# Patient Record
Sex: MALE | Race: WHITE | NOT HISPANIC OR LATINO | Employment: OTHER | URBAN - METROPOLITAN AREA
[De-identification: names, ages, dates, MRNs, and addresses within clinical notes are randomized per-mention and may not be internally consistent; named-entity substitution may affect disease eponyms.]

---

## 2017-06-04 ENCOUNTER — OFFICE VISIT (OUTPATIENT)
Dept: URGENT CARE | Facility: RETAIL CLINIC | Age: 67
End: 2017-06-04
Payer: COMMERCIAL

## 2017-06-04 VITALS
OXYGEN SATURATION: 100 % | TEMPERATURE: 97 F | HEART RATE: 70 BPM | DIASTOLIC BLOOD PRESSURE: 84 MMHG | SYSTOLIC BLOOD PRESSURE: 140 MMHG

## 2017-06-04 DIAGNOSIS — H65.01 RIGHT ACUTE SEROUS OTITIS MEDIA, RECURRENCE NOT SPECIFIED: Primary | ICD-10-CM

## 2017-06-04 DIAGNOSIS — Z96.22 STATUS POST MYRINGOTOMY WITH INSERTION OF TUBE: ICD-10-CM

## 2017-06-04 PROCEDURE — 99213 OFFICE O/P EST LOW 20 MIN: CPT | Performed by: PHYSICIAN ASSISTANT

## 2017-06-04 RX ORDER — AMOXICILLIN 875 MG
875 TABLET ORAL 2 TIMES DAILY
Qty: 20 TABLET | Refills: 0 | Status: SHIPPED | OUTPATIENT
Start: 2017-06-04

## 2017-06-04 NOTE — PROGRESS NOTES
See CC    SUBJECTIVE:   Pt. presenting to [facility] -  with a chief complaint of earache - possible infection vs cerumen (?). He thinks he has a hold in rt TM and knows he has PE tube left ear - there has been some yellowish fluid left ear. More ear pressure and ache on right.  No URI symptoms  [facility]  Onset of symptoms gradual 1-2 weeks, will see ENT but [facility].  Course of illness is same.    Severity mild  Current and Associated symptoms: ear discomfort and drainage on right  Treatment measures tried include None tried.  Predisposing factors include see above.  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     C UNLISTED LAPAROSCOPY PROC,INTESTINE  2000    Right hemicolectomy and chemotherapy for colon cancer     COLONOSCOPY  10/02/07     HC COLONOSCOPY W BX FORCEP/CAUTERY REMOVAL TELLY/POLYP/LESION  08/11/2004    [facility] [place]     HC CREATE EARDRUM OPENING,GEN ANESTH      P.E. Tubes     HC MYRINGOPLASTY  3/24/2004    Left exploratory tympanoplasty. Middle ear reconstruction with PORP.     SURGICAL HISTORY OF -   09/27/2006    Evaluate table saw injury, left hand,  ultimate amputation completion  & closure left thumb.       Patient Active Problem List   Diagnosis     Pain in  joint, pelvic region and thigh     Temporomandibular joint disorder     DEEP VENOUS PHLEBITIS-ARM     History of malignant neoplasm of large intestine     Chronic suppurative otitis media     Other diseases of nasal cavity and sinuses     Long term current use of anticoagulant therapy     Other specified disorder of skin     Other congenital anomaly of lung     Benign neoplasm of major salivary gland     Carpal tunnel syndrome     Problem with sexual function     Depressive disorder, not elsewhere classified     Tobacco abuse     AVM (arteriovenous malformation) brain     Current Outpatient Prescriptions   Medication     triamterene-hydrochlorothiazide (MAXZIDE-25) 37.5-25 MG per tablet     BUPROPION HCL PO     IBUPROFEN PO     DEXAMETHASONE PO     TOPIRAMATE PO     nystatin-triamcinolone (MYCOLOG) ointment     gabapentin (NEURONTIN) 300 MG tablet     esomeprazole (NEXIUM) 40 MG capsule     sildenafil (VIAGRA) 100 MG tablet     loratadine (CLARITIN) 10 MG tablet     tamsulosin (FLOMAX) 0.4 MG 24 hr capsule     cyclobenzaprine (FLEXERIL) 10 MG tablet     rOPINIRole (REQUIP) 1 MG tablet     rOPINIRole (REQUIP) 2 MG tablet     mometasone (NASONEX) 50 MCG/ACT nasal spray     latanoprost (XALATAN) 0.005 % ophthalmic solution     CELEBREX 200 MG OR CAPS     VICODIN 5-500 MG OR TABS     LEXAPRO 10 MG OR TABS     WELLBUTRIN  MG OR TBCR     No current facility-administered medications for this visit.        OBJECTIVE:  /84 (BP Location: Right arm, Patient Position: Chair, Cuff Size: Adult Regular)  Pulse 70  Temp 97  F (36.1  C) (Oral)  SpO2 100%    GENERAL APPEARANCE: cooperative, alert and no distress. Walks with cane  Appears well hydrated.  EYES: conjunctiva clear  HENT: Rt -wearing hearing aid -removes it for exam  Rt ear canal  Some yellowish cerumen and TM some erythema upper rim - no perf seen but I can see only 2/3 of TM  Lt ear canal with watery yellowish-green discharge and TM normal with PE tube  noted  Nose no congestion. no discharge  Mouth without ulcers or lesions. no erythema. no exudate.   NECK: supple, few small shoddy NT ant nodes. No  posterior nodes.  RESP: lungs clear to auscultation - no rales, rhonchi or wheezes. Breathing easily.      ASSESSMENT:     Right acute serous otitis media, recurrence not specified  Status post myringotomy with insertion of tube        PLAN:  Symptomatic measures   Prescriptions as below. Discussed indications, dosing, side affects and adverse reactions of medications with  Patient -Amox  Keep ears dry  FOLLOW UP with ENT as planned - check with [facility] ([facility]) or ENT earlier if not improving.  Eat yogurt daily or take a probiotic supplement when on antibiotics.  > rest.  > fluids.  Contagiousness and hygiene discussed.  Fever and pain  control measures discussed.  If unable to swallow or any breathing difficulty to go to ED     Pty is comfortable with this plan.  Electronically signed,  KIERA Worley

## 2017-06-04 NOTE — PATIENT INSTRUCTIONS
Please FOLLOW UP at primary care clinic if not improving, new symptoms, worse or this does not resolve.  FOLLOW UP w ENT as planned

## 2017-06-04 NOTE — MR AVS SNAPSHOT
"              After Visit Summary   2017    Anurag Mejia    MRN: [id number]           Patient Information     Date Of Birth          1950        Visit Information        Provider Department      2017 3:10 PM Lori Tanner PA-C [facility]        Today's Diagnoses     Right acute serous otitis media, recurrence not specified    -  1      Care Instructions      Please FOLLOW UP at primary care clinic if not improving, new symptoms, worse or this does not resolve.  FOLLOW UP w ENT as planned          Follow-ups after your visit        Who to contact     You can reach your care team any time of the day by calling [phone].  Notification of test results:  If you have an abnormal lab result, we will notify you by phone as soon as possible.         Additional Information About Your Visit        MyChart Information     [employer] lets you send messages to your doctor, view your test results, renew your prescriptions, schedule appointments and more. To sign up, go to [email or web address] . Click on \"Log in\" on the left side of the screen, which will take you to the Welcome page. Then click on \"Sign up Now\" on the right side of the page.     You will be asked to enter the access code listed below, as well as some personal information. Please follow the directions to create your username and password.     Your access code is: [email or web address]  Expires: 2017  3:22 PM     Your access code will  in 90 days. If you need help or a new code, please call your [facility] clinic or [phone].        Care EveryWhere ID     This is your Care EveryWhere ID. This could be used by other organizations to access your [facility] medical records  [id number]        Your Vitals Were     Pulse Temperature Pulse Oximetry             70 97  F (36.1  C) (Oral) 100%          Blood Pressure from Last 3 Encounters:   17 140/84   04/14/15 143/89   14 127/76    Weight from Last 3 Encounters: "   10/23/13 260 lb (117.9 kg)   08/09/07 240 lb (108.9 kg)   12/17/04 218 lb 4 oz (99 kg)              Today, you had the following     No orders found for display         Today's Medication Changes          These changes are accurate as of: 6/4/17  3:23 PM.  If you have any questions, ask your nurse or doctor.               Start taking these medicines.        Dose/Directions    amoxicillin 875 MG tablet   Commonly known as:  AMOXIL   Used for:  Right acute serous otitis media, recurrence not specified   Started by:  Lori Tanner PA-C        Dose:  875 mg   Take 1 tablet (875 mg) by mouth 2 times daily   Quantity:  20 tablet   Refills:  0            Where to get your medicines      These medications were sent to [address]  [address] [facility] [address]     Phone:  [phone]     amoxicillin 875 MG tablet                Primary Care Provider Office Phone # Fax #    Chris Olsen [phone]       [address] [address]        Thank you!     Thank you for choosing [facility]  for your care. Our goal is always to provide you with excellent care. Hearing back from our patients is one way we can continue to improve our services. Please take a few minutes to complete the written survey that you may receive in the mail after your visit with us. Thank you!             Your Updated Medication List - Protect others around you: Learn how to safely use, store and throw away your medicines at www.disposemymeds.org.          This list is accurate as of: 6/4/17  3:23 PM.  Always use your most recent med list.                   Brand Name Dispense Instructions for use    amoxicillin 875 MG tablet    AMOXIL    20 tablet    Take 1 tablet (875 mg) by mouth 2 times daily       celeBREX 200 MG capsule   Generic drug:  celecoxib     30    1 Capsule daily       DEXAMETHASONE PO      Take 4 mg by mouth        FLEXERIL 10 MG tablet   Generic drug:  cyclobenzaprine      Take 10 mg by mouth daily       gabapentin 300 MG tablet    NEURONTIN     Take 600 mg by mouth 2 times daily       IBUPROFEN PO      Take 200 mg by mouth       latanoprost 0.005 % ophthalmic solution    XALATAN     1 drop At Bedtime       LEXAPRO 10 MG tablet   Generic drug:  escitalopram     30    1 TABLET DAILY       loratadine 10 MG tablet    CLARITIN     Take 10 mg by mouth daily       NASONEX 50 MCG/ACT spray   Generic drug:  mometasone      Spray 2 sprays into both nostrils daily       nexIUM 40 MG CR capsule   Generic drug:  esomeprazole      Take by mouth every morning (before breakfast) Take 30-60 minutes before eating.       nystatin-triamcinolone ointment    MYCOLOG    30 g    Apply topically 2 times daily       * rOPINIRole 1 MG tablet    REQUIP     Take by mouth At Bedtime       * rOPINIRole 2 MG tablet    REQUIP     Take 2 mg by mouth At Bedtime       sildenafil 100 MG cap/tab    REVATIO/VIAGRA     Take by mouth daily as needed       tamsulosin 0.4 MG capsule    FLOMAX     Take by mouth daily       TOPIRAMATE PO      Take 50 mg by mouth       triamterene-hydrochlorothiazide 37.5-25 MG per tablet    MAXZIDE-25         VICODIN 5-500 MG per tablet   Generic drug:  HYDROcodone-acetaminophen     40    1 TABLET EVERY 4 TO 6 HOURS AS NEEDED       * BUPROPION HCL PO      Take 75 mg by mouth       * WELLBUTRIN  MG Tbcr     60    1 TABLET TWICE DAILY       * Notice:  This list has 4 medication(s) that are the same as other medications prescribed for you. Read the directions carefully, and ask your doctor or other care provider to review them with you.

## 2017-06-04 NOTE — NURSING NOTE
"Chief Complaint   Patient presents with     Otalgia     x 1 week bilateral ear discomfort, says wax build up and mucus. Has tubes in ears       Initial /84 (BP Location: Right arm, Patient Position: Chair, Cuff Size: Adult Regular)  Pulse 70  Temp 97  F (36.1  C) (Oral)  SpO2 100% Estimated body mass index is 36.26 kg/(m^2) as calculated from the following:    Height as of 10/23/13: 5' 11\" (1.803 m).    Weight as of 10/23/13: 260 lb (117.9 kg).  Medication Reconciliation: complete     Jessica Sundet      "

## 2017-06-05 ENCOUNTER — MEDICAL CORRESPONDENCE (OUTPATIENT)
Dept: HEALTH INFORMATION MANAGEMENT | Facility: CLINIC | Age: 67
End: 2017-06-05

## 2017-06-07 DIAGNOSIS — Q28.2 CEREBRAL-RETINAL ARTERIOVENOUS ANEURYSM: ICD-10-CM

## 2017-06-07 DIAGNOSIS — G60.9 IDIOPATHIC PERIPHERAL NEUROPATHY: ICD-10-CM

## 2017-06-07 DIAGNOSIS — R90.89 ABNORMAL COMPUTERIZED TOMOGRAPHY OF BRAIN: Primary | ICD-10-CM

## 2017-06-07 DIAGNOSIS — R51.9 FACIAL PAIN: ICD-10-CM

## 2017-06-07 DIAGNOSIS — G62.9 DEMYELINATING NEUROPATHY: ICD-10-CM

## 2017-06-07 DIAGNOSIS — R27.8 LOSS OF COORDINATION: ICD-10-CM

## 2017-06-07 DIAGNOSIS — F32.9 MAJOR DEPRESSION, SINGLE EPISODE: ICD-10-CM

## 2017-06-07 DIAGNOSIS — H53.9 UNSPECIFIED VISUAL DISTURBANCE: ICD-10-CM

## 2017-06-07 LAB
ALT SERPL W P-5'-P-CCNC: 27 U/L (ref 0–70)
AST SERPL W P-5'-P-CCNC: 12 U/L (ref 0–45)
BASOPHILS # BLD AUTO: 0.1 10E9/L (ref 0–0.2)
BASOPHILS NFR BLD AUTO: 0.6 %
CREAT SERPL-MCNC: 1.35 MG/DL (ref 0.66–1.25)
DIFFERENTIAL METHOD BLD: NORMAL
EOSINOPHIL # BLD AUTO: 0.2 10E9/L (ref 0–0.7)
EOSINOPHIL NFR BLD AUTO: 3 %
ERYTHROCYTE [DISTWIDTH] IN BLOOD BY AUTOMATED COUNT: 14.2 % (ref 10–15)
GFR SERPL CREATININE-BSD FRML MDRD: 53 ML/MIN/1.7M2
HCT VFR BLD AUTO: 46.6 % (ref 40–53)
HGB BLD-MCNC: 15.2 G/DL (ref 13.3–17.7)
IMM GRANULOCYTES # BLD: 0 10E9/L (ref 0–0.4)
IMM GRANULOCYTES NFR BLD: 0.2 %
LYMPHOCYTES # BLD AUTO: 1.7 10E9/L (ref 0.8–5.3)
LYMPHOCYTES NFR BLD AUTO: 21.1 %
MCH RBC QN AUTO: 28.9 PG (ref 26.5–33)
MCHC RBC AUTO-ENTMCNC: 32.6 G/DL (ref 31.5–36.5)
MCV RBC AUTO: 89 FL (ref 78–100)
MONOCYTES # BLD AUTO: 0.5 10E9/L (ref 0–1.3)
MONOCYTES NFR BLD AUTO: 6.5 %
NEUTROPHILS # BLD AUTO: 5.5 10E9/L (ref 1.6–8.3)
NEUTROPHILS NFR BLD AUTO: 68.6 %
PLATELET # BLD AUTO: 286 10E9/L (ref 150–450)
RBC # BLD AUTO: 5.26 10E12/L (ref 4.4–5.9)
WBC # BLD AUTO: 8 10E9/L (ref 4–11)

## 2017-06-07 PROCEDURE — 80201 ASSAY OF TOPIRAMATE: CPT | Mod: 90 | Performed by: PSYCHIATRY & NEUROLOGY

## 2017-06-07 PROCEDURE — 99000 SPECIMEN HANDLING OFFICE-LAB: CPT | Performed by: PSYCHIATRY & NEUROLOGY

## 2017-06-07 PROCEDURE — 36415 COLL VENOUS BLD VENIPUNCTURE: CPT | Performed by: PSYCHIATRY & NEUROLOGY

## 2017-06-07 PROCEDURE — 82565 ASSAY OF CREATININE: CPT | Performed by: PSYCHIATRY & NEUROLOGY

## 2017-06-07 PROCEDURE — 84460 ALANINE AMINO (ALT) (SGPT): CPT | Performed by: PSYCHIATRY & NEUROLOGY

## 2017-06-07 PROCEDURE — 84450 TRANSFERASE (AST) (SGOT): CPT | Performed by: PSYCHIATRY & NEUROLOGY

## 2017-06-07 PROCEDURE — 85025 COMPLETE CBC W/AUTO DIFF WBC: CPT | Performed by: PSYCHIATRY & NEUROLOGY

## 2017-06-08 LAB — TOPIRAMATE SERPL-MCNC: 4.9 UG/ML

## 2018-04-25 ENCOUNTER — TRANSFERRED RECORDS (OUTPATIENT)
Dept: HEALTH INFORMATION MANAGEMENT | Facility: CLINIC | Age: 68
End: 2018-04-25

## 2019-06-28 ENCOUNTER — TRANSFERRED RECORDS (OUTPATIENT)
Dept: HEALTH INFORMATION MANAGEMENT | Facility: CLINIC | Age: 69
End: 2019-06-28

## 2019-07-09 ENCOUNTER — TRANSFERRED RECORDS (OUTPATIENT)
Dept: HEALTH INFORMATION MANAGEMENT | Facility: CLINIC | Age: 69
End: 2019-07-09

## 2019-08-14 NOTE — PROGRESS NOTES
ENT Consultation    Anurag Mejia who is a 68 year old male seen in consultation at the request of [facility].      History of Present Illness - Anurag Mejia is a 68 year old male with a known history of ear disease.  He has had a tympanoplasty procedure done on his left ear the past and feels that slowly his hearing he [facility] and therefore he is following back with me.  I did surgeries in his ear many years ago.  It was just the left side.  But the last 6 months he has been battling problem with the right side intermittent discharge pressure in the discharge stopped after multiple courses of eardrops and antibiotics he still feels the ear pressure persists and intermittent drainage persists feels that hearing is slightly worse.        BP Readings from Last 1 Encounters:   06/04/17 140/84       BP noted to be well controlled today in office.     Anurag IS NOT a smoker/uses chewing tobacco.        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     Depressive disorder, not elsewhere classified      Esophageal reflux      Generalized osteoarthrosis, unspecified site      Malignant neoplasm of colon, unspecified site 2000    Colon cancer     Other malaise and fatigue     Fatigue, exhaustion, memory loss     Personal history of tobacco use, presenting hazards to health      Unspecified hearing loss        Current Medications -   Current Outpatient Medications:      amoxicillin (AMOXIL) 875 MG tablet, Take 1 tablet (875 mg) by mouth 2 times daily, Disp: 20 tablet, Rfl: 0     BUPROPION HCL PO, Take 75 mg by mouth, Disp: , Rfl:      CELEBREX 200 MG OR CAPS, 1 Capsule daily, Disp: 30, Rfl: 0     cyclobenzaprine (FLEXERIL) 10 MG tablet, Take 10 mg by mouth daily, Disp: , Rfl:      DEXAMETHASONE PO, Take 4 mg by  mouth, Disp: , Rfl:      esomeprazole (NEXIUM) 40 MG capsule, Take by mouth every morning (before breakfast) Take 30-60 minutes before eating., Disp: , Rfl:      gabapentin (NEURONTIN) 300 MG tablet, Take 600 mg by mouth 2 times daily, Disp: , Rfl:      IBUPROFEN PO, Take 200 mg by mouth, Disp: , Rfl:      latanoprost (XALATAN) 0.005 % ophthalmic solution, 1 drop At Bedtime, Disp: , Rfl:      LEXAPRO 10 MG OR TABS, 1 TABLET DAILY (Patient not taking: No sig reported), Disp: 30, Rfl: 6     loratadine (CLARITIN) 10 MG tablet, Take 10 mg by mouth daily, Disp: , Rfl:      mometasone (NASONEX) 50 MCG/ACT nasal spray, Spray 2 sprays into both nostrils daily, Disp: , Rfl:      nystatin-triamcinolone (MYCOLOG) ointment, Apply topically 2 times daily (Patient not taking: Reported on 6/4/2017), Disp: 30 g, Rfl: 1     rOPINIRole (REQUIP) 1 MG tablet, Take by mouth At Bedtime, Disp: , Rfl:      rOPINIRole (REQUIP) 2 MG tablet, Take 2 mg by mouth At Bedtime, Disp: , Rfl:      sildenafil (VIAGRA) 100 MG tablet, Take by mouth daily as needed, Disp: , Rfl:      tamsulosin (FLOMAX) 0.4 MG 24 hr capsule, Take by mouth daily, Disp: , Rfl:      TOPIRAMATE PO, Take 50 mg by mouth, Disp: , Rfl:      triamterene-hydrochlorothiazide (MAXZIDE-25) 37.5-25 MG per tablet, , Disp: , Rfl:      VICODIN 5-500 MG OR TABS, 1 TABLET EVERY 4 TO 6 HOURS AS NEEDED, Disp: 40, Rfl: 0     WELLBUTRIN  MG OR TBCR, 1 TABLET TWICE DAILY, Disp: 60, Rfl: 6    Allergies -   Allergies   Allergen Reactions     Adhesive Tape      Plastic, paper is OK     Moxifloxacin Hydrochloride      avelox     Trovafloxacin      trovan       Social History -   Social History     Socioeconomic History     Marital status:      Spouse name: Janene     Number of children: 5     Years of education: Not on file     Highest education level: Not on file   Occupational History     Employer: SELF     Comment: retired   Social Needs     Financial resource strain: Not on file      Food insecurity:     Worry: Not on file     Inability: Not on file     Transportation needs:     Medical: Not on file     Non-medical: Not on file   Tobacco Use     Smoking status: Former Smoker     Packs/day: 2.00     Years: 30.00     Pack years: 60.00     Types: Cigarettes     Last attempt to quit: 2000     Years since quittin.9     Smokeless tobacco: Current User     Types: Chew   Substance and Sexual Activity     Alcohol use: No     Drug use: No     Sexual activity: Not on file   Lifestyle     Physical activity:     Days per week: Not on file     Minutes per session: Not on file     Stress: Not on file   Relationships     Social connections:     Talks on phone: Not on file     Gets together: Not on file     Attends Buddhism service: Not on file     Active member of club or organization: Not on file     Attends meetings of clubs or organizations: Not on file     Relationship status: Not on file     Intimate partner violence:     Fear of current or ex partner: Not on file     Emotionally abused: Not on file     Physically abused: Not on file     Forced sexual activity: Not on file   Other Topics Concern     Not on file   Social History Narrative     Not on file       Family History -   Family History   Problem Relation Age of Onset     Cerebrovascular Disease Father         x2     Cerebrovascular Disease Brother         x2     Cancer - colorectal Paternal Grandmother      Cancer - colorectal Other         mario alberto     Depression Other         Mario Alberto     Heart Disease Father         angioplasty x2     Heart Disease Brother         angioplasty x2     Heart Disease Paternal Uncle         two uncles had MI     Obesity Father      Obesity Brother      Thyroid Disease No family hx of        Review of Systems - As per HPI and PMHx, otherwise review of system review of the head and neck negative. Otherwise 10+ review of system is negative    Physical Exam  There were no vitals taken for this visit.  BMI: There is  no height or weight on file to calculate BMI.    General - The patient is well nourished and well developed, and appears to have good nutritional status.  Alert and oriented to person and place, answers questions and cooperates with examination appropriately.    SKIN - No suspicious lesions or rashes.  Respiration - No respiratory distress.  Head and Face - Normocephalic and atraumatic, with no gross asymmetry noted of the contour of the facial features.  The facial nerve is intact, with strong symmetric movements.    Voice and Breathing - The patient was breathing comfortably without the use of accessory muscles. The patients voice was clear and strong, and had appropriate pitch and quality.    Ears - Bilateral pinna and EACs with normal appearing overlying skin.  On the right side we see that the patient does have some debris that was suctioned and see a small pinhole perforation superiorly some granulation tissue the drum TM is thickened otherwise no active discharge in the canal.  On the left side small tiny central perforation is noted pinhole with thickened tympanosclerotic drum.  Canal clear and dry.  No postauricular tenderness or erythema appreciated in either side.  Eyes - Extraocular movements intact.  Sclera were not icteric or injected, conjunctiva were pink and moist.    Mouth - Examination of the oral cavity showed pink, healthy oral mucosa. No lesions or ulcerations noted.  The tongue was mobile and midline, and the dentition were in good condition.      Throat - The walls of the oropharynx were smooth, pink, moist, symmetric, and had no lesions or ulcerations.  The tonsillar pillars and soft palate were symmetric.  The uvula was midline on elevation.    Neck - Normal midline excursion of the laryngotracheal complex during swallowing.  Full range of motion on passive movement.  Palpation of the occipital, submental, submandibular, internal jugular chain, and supraclavicular nodes did not demonstrate  any abnormal lymph nodes or masses.  The carotid pulse was palpable bilaterally.  Palpation of the thyroid was soft and smooth, with no nodules or goiter appreciated.  The trachea was mobile and midline.    Nose - External contour is symmetric, no gross deflection or scars.  Nasal mucosa is pink and moist with no abnormal mucus.  The septum was midline and non-obstructive, turbinates of normal size and position.  No polyps, masses, or purulence noted on examination.    Neuro - Nonfocal neuro exam is normal, CN 2 through 12 intact, normal gait and muscle tone.      [facility] and [facility] showing on the right patient patient has nearly complete opacification of the mastoid air cells with some aeration but the antrum is completely blocked with disease in the middle ear is completely filled and opacified.  On the left side ICA some contraction of opacification of mastoid but the antrum and middle ear space appeared to be clear.    Performed in clinic today:  Audiologic Studies - An audiogram and tympanogram were performed today as part of the evaluation and personally reviewed. The tympanogram shows Type B curves on the right and Type A shallow almost B curves on the left, with High canal volumes and middle ear pressures.  The audiogram showed Mild to moderate sensorineural sloping loss on the right and Moderate mixed losson the left.        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  We discussed further work-up potential therapy considering he is already failed multiple antibiotics and drops.  Should like him to see Dr[facility] Maradiaga [facility]  to help us with management of his ear disease.      Eddie Suggs MD

## 2019-08-19 ENCOUNTER — OFFICE VISIT (OUTPATIENT)
Dept: OTOLARYNGOLOGY | Facility: CLINIC | Age: 69
End: 2019-08-19
Payer: COMMERCIAL

## 2019-08-19 ENCOUNTER — OFFICE VISIT (OUTPATIENT)
Dept: AUDIOLOGY | Facility: CLINIC | Age: 69
End: 2019-08-19
Payer: COMMERCIAL

## 2019-08-19 DIAGNOSIS — H90.A32 MIXED CONDUCTIVE AND SENSORINEURAL HEARING LOSS OF LEFT EAR WITH RESTRICTED HEARING OF RIGHT EAR: Primary | ICD-10-CM

## 2019-08-19 DIAGNOSIS — H90.A21 SENSORINEURAL HEARING LOSS (SNHL) OF RIGHT EAR WITH RESTRICTED HEARING OF LEFT EAR: ICD-10-CM

## 2019-08-19 DIAGNOSIS — H70.11 CHRONIC MASTOIDITIS OF RIGHT SIDE: Primary | ICD-10-CM

## 2019-08-19 PROCEDURE — 99203 OFFICE O/P NEW LOW 30 MIN: CPT | Performed by: OTOLARYNGOLOGY

## 2019-08-19 PROCEDURE — 92550 TYMPANOMETRY & REFLEX THRESH: CPT | Performed by: AUDIOLOGIST

## 2019-08-19 PROCEDURE — 92557 COMPREHENSIVE HEARING TEST: CPT | Performed by: AUDIOLOGIST

## 2019-08-19 PROCEDURE — 99207 ZZC NO CHARGE LOS: CPT | Performed by: AUDIOLOGIST

## 2019-08-19 NOTE — LETTER
8/19/2019         RE: Anurag Mejia  [address] [address] [place] [id number]        Dear Colleague,    Thank you for referring your patient, Anurag Mejia, to the [facility]. Please see a copy of my visit note below.    ENT Consultation    Anurag Mejia who is a 68 year old male seen in consultation at the request of [facility].      History of Present Illness - Anurag Mejia is a 68 year old male with a known history of ear disease.  He has had a tympanoplasty procedure done on his left ear the past and feels that slowly his hearing [facility] and therefore he is following back with me.  I did surgeries in his ear many years ago.  It was just the left side.  But the last 6 months he has been battling problem with the right side intermittent discharge pressure in the discharge stopped after multiple courses of eardrops and antibiotics he still feels the ear pressure persists and intermittent drainage persists feels that hearing is slightly worse.        BP Readings from Last 1 Encounters:   06/04/17 140/84       BP noted to be well controlled today in office.     Anurag IS NOT a smoker/uses chewing tobacco.        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     Depressive disorder, not elsewhere classified      Esophageal reflux      Generalized osteoarthrosis, unspecified site      Malignant neoplasm of colon, unspecified site 2000    Colon cancer     Other malaise and fatigue     Fatigue, exhaustion, memory loss     Personal history of tobacco use, presenting hazards to health      Unspecified hearing loss        Current Medications -   Current Outpatient Medications:      amoxicillin (AMOXIL) 875 MG tablet, Take 1 tablet (875 mg) by mouth 2 times daily, Disp: 20  tablet, Rfl: 0     BUPROPION HCL PO, Take 75 mg by mouth, Disp: , Rfl:      CELEBREX 200 MG OR CAPS, 1 Capsule daily, Disp: 30, Rfl: 0     cyclobenzaprine (FLEXERIL) 10 MG tablet, Take 10 mg by mouth daily, Disp: , Rfl:      DEXAMETHASONE PO, Take 4 mg by mouth, Disp: , Rfl:      esomeprazole (NEXIUM) 40 MG capsule, Take by mouth every morning (before breakfast) Take 30-60 minutes before eating., Disp: , Rfl:      gabapentin (NEURONTIN) 300 MG tablet, Take 600 mg by mouth 2 times daily, Disp: , Rfl:      IBUPROFEN PO, Take 200 mg by mouth, Disp: , Rfl:      latanoprost (XALATAN) 0.005 % ophthalmic solution, 1 drop At Bedtime, Disp: , Rfl:      LEXAPRO 10 MG OR TABS, 1 TABLET DAILY (Patient not taking: No sig reported), Disp: 30, Rfl: 6     loratadine (CLARITIN) 10 MG tablet, Take 10 mg by mouth daily, Disp: , Rfl:      mometasone (NASONEX) 50 MCG/ACT nasal spray, Spray 2 sprays into both nostrils daily, Disp: , Rfl:      nystatin-triamcinolone (MYCOLOG) ointment, Apply topically 2 times daily (Patient not taking: Reported on 6/4/2017), Disp: 30 g, Rfl: 1     rOPINIRole (REQUIP) 1 MG tablet, Take by mouth At Bedtime, Disp: , Rfl:      rOPINIRole (REQUIP) 2 MG tablet, Take 2 mg by mouth At Bedtime, Disp: , Rfl:      sildenafil (VIAGRA) 100 MG tablet, Take by mouth daily as needed, Disp: , Rfl:      tamsulosin (FLOMAX) 0.4 MG 24 hr capsule, Take by mouth daily, Disp: , Rfl:      TOPIRAMATE PO, Take 50 mg by mouth, Disp: , Rfl:      triamterene-hydrochlorothiazide (MAXZIDE-25) 37.5-25 MG per tablet, , Disp: , Rfl:      VICODIN 5-500 MG OR TABS, 1 TABLET EVERY 4 TO 6 HOURS AS NEEDED, Disp: 40, Rfl: 0     WELLBUTRIN  MG OR TBCR, 1 TABLET TWICE DAILY, Disp: 60, Rfl: 6    Allergies -   Allergies   Allergen Reactions     Adhesive Tape      Plastic, paper is OK     Moxifloxacin Hydrochloride      avelox     Trovafloxacin      trovan       Social History -   Social History     Socioeconomic History     Marital status:       Spouse name: Janene     Number of children: 5     Years of education: Not on file     Highest education level: Not on file   Occupational History     Employer: SELF     Comment: retired   Social Needs     Financial resource strain: Not on file     Food insecurity:     Worry: Not on file     Inability: Not on file     Transportation needs:     Medical: Not on file     Non-medical: Not on file   Tobacco Use     Smoking status: Former Smoker     Packs/day: 2.00     Years: 30.00     Pack years: 60.00     Types: Cigarettes     Last attempt to quit: 2000     Years since quittin.9     Smokeless tobacco: Current User     Types: Chew   Substance and Sexual Activity     Alcohol use: No     Drug use: No     Sexual activity: Not on file   Lifestyle     Physical activity:     Days per week: Not on file     Minutes per session: Not on file     Stress: Not on file   Relationships     Social connections:     Talks on phone: Not on file     Gets together: Not on file     Attends Quaker service: Not on file     Active member of club or organization: Not on file     Attends meetings of clubs or organizations: Not on file     Relationship status: Not on file     Intimate partner violence:     Fear of current or ex partner: Not on file     Emotionally abused: Not on file     Physically abused: Not on file     Forced sexual activity: Not on file   Other Topics Concern     Not on file   Social History Narrative     Not on file       Family History -   Family History   Problem Relation Age of Onset     Cerebrovascular Disease Father         x2     Cerebrovascular Disease Brother         x2     Cancer - colorectal Paternal Grandmother      Cancer - colorectal Other         mario alberto     Depression Other         Mario Alberto     Heart Disease Father         angioplasty x2     Heart Disease Brother         angioplasty x2     Heart Disease Paternal Uncle         two uncles had MI     Obesity Father      Obesity Brother      Thyroid  Disease No family hx of        Review of Systems - As per HPI and PMHx, otherwise review of system review of the head and neck negative. Otherwise 10+ review of system is negative    Physical Exam  There were no vitals taken for this visit.  BMI: There is no height or weight on file to calculate BMI.    General - The patient is well nourished and well developed, and appears to have good nutritional status.  Alert and oriented to person and place, answers questions and cooperates with examination appropriately.    SKIN - No suspicious lesions or rashes.  Respiration - No respiratory distress.  Head and Face - Normocephalic and atraumatic, with no gross asymmetry noted of the contour of the facial features.  The facial nerve is intact, with strong symmetric movements.    Voice and Breathing - The patient was breathing comfortably without the use of accessory muscles. The patients voice was clear and strong, and had appropriate pitch and quality.    Ears - Bilateral pinna and EACs with normal appearing overlying skin.  On the right side we see that the patient does have some debris that was suctioned and see a small pinhole perforation superiorly some granulation tissue the drum TM is thickened otherwise no active discharge in the canal.  On the left side small tiny central perforation is noted pinhole with thickened tympanosclerotic drum.  Canal clear and dry.  No postauricular tenderness or erythema appreciated in either side.  Eyes - Extraocular movements intact.  Sclera were not icteric or injected, conjunctiva were pink and moist.    Mouth - Examination of the oral cavity showed pink, healthy oral mucosa. No lesions or ulcerations noted.  The tongue was mobile and midline, and the dentition were in good condition.      Throat - The walls of the oropharynx were smooth, pink, moist, symmetric, and had no lesions or ulcerations.  The tonsillar pillars and soft palate were symmetric.  The uvula was midline on  elevation.    Neck - Normal midline excursion of the laryngotracheal complex during swallowing.  Full range of motion on passive movement.  Palpation of the occipital, submental, submandibular, internal jugular chain, and supraclavicular nodes did not demonstrate any abnormal lymph nodes or masses.  The carotid pulse was palpable bilaterally.  Palpation of the thyroid was soft and smooth, with no nodules or goiter appreciated.  The trachea was mobile and midline.    Nose - External contour is symmetric, no gross deflection or scars.  Nasal mucosa is pink and moist with no abnormal mucus.  The septum was midline and non-obstructive, turbinates of normal size and position.  No polyps, masses, or purulence noted on examination.    Neuro - Nonfocal neuro exam is normal, CN 2 through 12 intact, normal gait and muscle tone.      [facility] and reviewed showing on the right patient patient has nearly complete opacification of the mastoid air cells with some aeration but the antrum is completely blocked with disease in the middle ear is completely filled and opacified.  On the left side ICA some contraction of opacification of mastoid but the antrum and middle ear space appeared to be clear.    Performed in clinic today:  Audiologic Studies - An audiogram and tympanogram were performed today as part of the evaluation and personally reviewed. The tympanogram shows Type B curves on the right and Type A shallow almost B curves on the left, with High canal volumes and middle ear pressures.  The audiogram showed Mild to moderate sensorineural sloping loss on the right and Moderate mixed losson the left.        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  We discussed further work-up potential therapy considering he is already failed multiple antibiotics and drops.  Should like him to see  Dr. Maardiaga with Dr. Munoz at the [facility] to help us with management of his ear disease.      Eddie Suggs MD    Again, thank you for allowing me to participate in the care of your patient.        Sincerely,        Eddie Suggs MD, MD

## 2019-08-19 NOTE — PROGRESS NOTES
AUDIOLOGY REPORT     SUMMARY: Audiology visit completed. See audiogram for results.     RECOMMENDATIONS: Follow-up with ENT    Daya Shell Licensed Audiologist #[id number]

## 2019-08-21 ENCOUNTER — TELEPHONE (OUTPATIENT)
Dept: SLEEP MEDICINE | Facility: CLINIC | Age: 69
End: 2019-08-21

## 2019-08-21 NOTE — TELEPHONE ENCOUNTER
FUTURE VISIT INFORMATION      FUTURE VISIT INFORMATION:    Date: 8/27/19    Time: 8:30AM (Audio) / 10AM (ENT)    Location: [facility]  REFERRAL INFORMATION:    Referring provider:  Eddie Suggs MD    Referring providers clinic:  [facility]    Reason for visit/diagnosis  : Chronic mastoiditis of right side     RECORDS REQUESTED FROM:       Clinic name Comments Records Status Imaging Status   [facility] 8/19/19 notes with Dr Suggs and audio with Wilma Simeon  [facility] 6/4/17 notes with Lori SALOMON [facility] imaging 10/22/13 MR Brain  [facility] 7/9/19 CT ORbit & CT Head    *over 106 pages received and in Drawer   In Drawer PACS                 8/21/19 3:09PM sent a fax to [facility] for reports and possible   recs - Amay

## 2019-08-21 NOTE — TELEPHONE ENCOUNTER
Faxed requested information to the fax number below. Patient/wife updated.    Chen Padilla RN on 8/21/2019 at 3:25 PM

## 2019-08-21 NOTE — TELEPHONE ENCOUNTER
Reason for Call:  Other appointment    Detailed comments: Patient was referred to a doctor down at the [facility] and [facility] told patient that they need to get notes faxed from Monday 08/19 with Dr. Suggs and also an order and letter of recommendation faxed to clinic. They asked that we put this on the cover sheet: URGENT ATTN Sherri also put date/time and location of appt 08/27 8:30 hearing test 10:00 consult with  Dr. Kirstie Maradiaga at [facility]. Please fax to:  fax [phone]    Phone Number Patient can be reached at: Home number on file [phone] (home)    Best Time: any    Can we leave a detailed message on this number? YES    Call taken on 8/21/2019 at 2:53 PM by Chen Belle

## 2019-08-22 ENCOUNTER — DOCUMENTATION ONLY (OUTPATIENT)
Dept: CARE COORDINATION | Facility: CLINIC | Age: 69
End: 2019-08-22

## 2019-08-23 ENCOUNTER — TELEPHONE (OUTPATIENT)
Dept: SLEEP MEDICINE | Facility: CLINIC | Age: 69
End: 2019-08-23

## 2019-08-23 NOTE — TELEPHONE ENCOUNTER
Reason for Call:  Other [facility]     Detailed comments: [facility] called to tell us that Francisco Javier needs to share the Auth with wherever he is sending this PT. That's all she said no other information was given.     Phone Number Patient can be reached at: Home number on file [phone] (home)    Best Time: NA    Can we leave a detailed message on this number? YES    Call taken on 8/23/2019 at 11:11 AM by Irma Durand

## 2019-08-26 NOTE — TELEPHONE ENCOUNTER
That is great I am more than willing to share authorization with any other referrals but I do not know how to do that for the [facility].  Please advise and facilitate us with sharing the information properly and authorization so [facility] covers it.  Eddie Suggs MD

## 2019-08-27 ENCOUNTER — OFFICE VISIT (OUTPATIENT)
Dept: OTOLARYNGOLOGY | Facility: CLINIC | Age: 69
End: 2019-08-27
Attending: OTOLARYNGOLOGY
Payer: COMMERCIAL

## 2019-08-27 ENCOUNTER — PRE VISIT (OUTPATIENT)
Dept: OTOLARYNGOLOGY | Facility: CLINIC | Age: 69
End: 2019-08-27

## 2019-08-27 VITALS — RESPIRATION RATE: 16 BRPM | BODY MASS INDEX: 34.3 KG/M2 | HEIGHT: 73 IN

## 2019-08-27 DIAGNOSIS — H66.3X1 CHRONIC SUPPURATIVE OTITIS MEDIA OF RIGHT EAR, UNSPECIFIED OTITIS MEDIA LOCATION: Primary | ICD-10-CM

## 2019-08-27 LAB
GRAM STN SPEC: ABNORMAL
SPECIMEN SOURCE: ABNORMAL

## 2019-08-27 RX ORDER — CLOTRIMAZOLE 1 G/ML
SOLUTION TOPICAL
Qty: 15 ML | Refills: 0 | Status: SHIPPED | OUTPATIENT
Start: 2019-08-27 | End: 2019-09-10

## 2019-08-27 RX ORDER — CLOTRIMAZOLE 1 G/ML
SOLUTION TOPICAL
Qty: 10 ML | Refills: 0 | Status: SHIPPED | OUTPATIENT
Start: 2019-08-27 | End: 2019-08-27

## 2019-08-27 ASSESSMENT — PAIN SCALES - GENERAL: PAINLEVEL: NO PAIN (0)

## 2019-08-27 NOTE — PROGRESS NOTES
Neurotology Clinic      Name: Anurag Mejia  MRN: [id number]  Age: 68 year old  : 1950  Referring provider: Eddie Suggs  2019      Chief Complaint:   Chronic otitis media     History of Present Illness:   Anurag Mejia is a 68 year old male who presents for consultation regarding chronic otitis media.  Consultation was requested by Dr. Eddie Suggs. The patient has a known history of chronic otitis media for which he has undergone multiple otologic procedures. He reports recurrent acute otitis media as a child which persisted into adulthood. He has seen multiple providers regarding his ear issues, including Dr. Eddie Suggs. He most recently underwent left tympanoplasty with PORP placement with Dr. Suggs in . He subsequently [facility]. He reports that he has been experiencing bilateral purulent otorrhea since 2019 for which he was placed on multiple oral and topical antimicrobials including Bactrim, Cefdinir, clotrimazole topical, acetic acid topical, vancomycin topical, and Ciprodex. He was referred back to Dr. Suggs whom he saw on 2019 who referred the patient to the [facility]. He reports continued otorrhea, worse in the right ear. He has previously been fitted for hearing aids in  and currently wears them. He reports that he refrained from using them for weeks without resolution of otorrhea.  He uses Q-tips still as he likes the feel of them when his ears are wet and itchy.      Review of Systems:   Pertinent items are noted in HPI or as in patient entered ROS below, remainder of complete ROS is negative.     Active Medications:     Current Outpatient Medications:      amoxicillin (AMOXIL) 875 MG tablet, Take 1 tablet (875 mg) by mouth 2 times daily, Disp: 20 tablet, Rfl: 0     BUPROPION HCL PO, Take 75 mg by mouth, Disp: , Rfl:      CELEBREX 200 MG OR  CAPS, 1 Capsule daily, Disp: 30, Rfl: 0     cyclobenzaprine (FLEXERIL) 10 MG tablet, Take 10 mg by mouth daily, Disp: , Rfl:      DEXAMETHASONE PO, Take 4 mg by mouth, Disp: , Rfl:      esomeprazole (NEXIUM) 40 MG capsule, Take by mouth every morning (before breakfast) Take 30-60 minutes before eating., Disp: , Rfl:      gabapentin (NEURONTIN) 300 MG tablet, Take 600 mg by mouth 2 times daily, Disp: , Rfl:      IBUPROFEN PO, Take 200 mg by mouth, Disp: , Rfl:      latanoprost (XALATAN) 0.005 % ophthalmic solution, 1 drop At Bedtime, Disp: , Rfl:      LEXAPRO 10 MG OR TABS, 1 TABLET DAILY (Patient not taking: No sig reported), Disp: 30, Rfl: 6     loratadine (CLARITIN) 10 MG tablet, Take 10 mg by mouth daily, Disp: , Rfl:      mometasone (NASONEX) 50 MCG/ACT nasal spray, Spray 2 sprays into both nostrils daily, Disp: , Rfl:      nystatin-triamcinolone (MYCOLOG) ointment, Apply topically 2 times daily (Patient not taking: Reported on 6/4/2017), Disp: 30 g, Rfl: 1     rOPINIRole (REQUIP) 1 MG tablet, Take by mouth At Bedtime, Disp: , Rfl:      rOPINIRole (REQUIP) 2 MG tablet, Take 2 mg by mouth At Bedtime, Disp: , Rfl:      sildenafil (VIAGRA) 100 MG tablet, Take by mouth daily as needed, Disp: , Rfl:      tamsulosin (FLOMAX) 0.4 MG 24 hr capsule, Take by mouth daily, Disp: , Rfl:      TOPIRAMATE PO, Take 50 mg by mouth, Disp: , Rfl:      triamterene-hydrochlorothiazide (MAXZIDE-25) 37.5-25 MG per tablet, , Disp: , Rfl:      VICODIN 5-500 MG OR TABS, 1 TABLET EVERY 4 TO 6 HOURS AS NEEDED, Disp: 40, Rfl: 0     WELLBUTRIN  MG OR TBCR, 1 TABLET TWICE DAILY, Disp: 60, Rfl: 6      Allergies:   Adhesive tape; Moxifloxacin hydrochloride; and Trovafloxacin      Past Medical History:  AVM  Cholesteatoma  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   Tobacco Use: Former smoker, current smokeless user  Alcohol Use: No  PCP: Chris Olsen      Physical Exam:   There were no vitals taken for this visit.       Constitutional:  The patient was accompanied by his wife, well-groomed, and in no acute distress.     Skin: Normal:  warm and pink without rash   Neurologic: Alert and oriented x 3.  CN's III-XII within normal limits.  Voice normal.    Psychiatric: The patient's affect was calm, cooperative, and appropriate.     Communication:  Normal; communicates verbally, normal voice quality.   Respiratory: Breathing comfortably without stridor or exertion of accessory muscles.    Eyes: Pupils were equal and reactive.  Extraocular movement intact.     Ears: Pinnae and tragus non-tender.      Otologic microscope exam:  Right ear: Ear canal with purulent otorrhea pooling in inferior sulcus, 15% anterosuperior TM perforation with clean edges, dry crusting on surface of TM extending from site of perforation to canal with overlying fungal hyphae, debrided with suction and right angle hook, diffuse TM tympanosclerosis.  Middle ear is clear and not infected in appearance.  Material was on surface of TM.  Left ear: Ear canal patent, dry, anterior dry crusting debrided with suction to reveal 5% TM perforation with squamous ingrowth, squamous debris partially removed with right angle hook to confirm that growth extends under TM, TM with diffuse tympanosclerosis, cartilage graft vs. large HA prosthesis head in place in posteroinferior quadrant, mucosalization of superoposterior TM with associated otorrhea    Audiogram:  AUDIOGRAM: He underwent an  audiogram today. This demonstrated:  Right normal sloping to severe sensorineural hearing loss.  Left moderate-severe sloping to severe mixed hearing loss.     The speech reception threshold is 30 dB on the right, 55 dB on the left. Word recognition 92% right, 100% left. Tympanograms showed type B on the right and type As on the left. Acoustic reflexes: absent in all conditions.    Lab Data  Ear culture (7/1/2019) - Klebsiella variicola, ampicillin resistant    Ear culture (5/19/2019) - Klebsiella pneumoniae, Enterococcus faecalis, Achromobacter xylosoxidans, ciprofloxacin resistant    Ear culture (2/5/2019) - Staphylococcus hominis, Aspergillus fumigatus    Imaging:  CT Temporal Bones (7/9/2019)    Impression from radiologist:    1.  Postsurgical change of left temporal bone with mastoidectomy and ossicular reconstruction prosthesis. The left hypotympanum is opacified as is the distal tip of the prosthesis, which may be debris, granulation, or cholesteatoma.     2. Near complete opacification of the right middle ear and mastoid. No bony erosion on the right.    3.  Chronic sclerosis of the bilateral mastoids.  There is some aeration of the left mastoid antrum.    CT Temporal Bones reviewed by us. Right mastoid cavity sclerotic, middle ear opacification. No erosion noted.  Left mastoid cavity sclerotic, osseous defect of posterior ear canal with soft tissue partially filling defect, PORP noted in middle ear, malleus remnant noted, no incus noted.       Assessment and Plan:  Anurag Mejia is a 68 year old male with bilateral chronic otitis media s/p multiple left-sided otologic surgeries now with persistent bilateral purulent otorrhea.  The patient has experienced persistent purulent otorrhea from both ears despite multiple courses of oral and topical antimicrobial therapy.  The patient is previously been cultured, and per the patient's report different organisms grew from each ear, however the results of these  cultures are unavailable at today's visit.  Right ear culture was obtained in clinic today.  The right tympanic membrane perforation margins appear clean and the patient is likely receiving benefit from this perforation with regard to middle ear ventilation. Recommend clotrimazole otic drops to right ear. We will call him with the results of his culture and advise regarding antibiotic use at this time. He should abstain from hearing aid use in his right ear and observe dry ear precautions.    With regard to his left ear minimal otorrhea was noted, however he has a 5% anteriorly based central perforation with evidence of squamous ingrowth. The squamous debris was partially debrided in clinic today, however he will need to continue to follow-up with a sophisticated observer to ensure that this does not demonstrate progression.  Patient also has evidence of mucosalization of the superior posterior portion of his TM with associated otorrhea.  Should he continue to experience otorrhea from this year we will consider application of otologic powder or TCA application to the site of mucosalization.    We will determine an appropriate time for follow-up once we have reviewed the results of his right ear culture.      Ajit Hurtado MD  Neurotology Fellow  Department of Otolaryngology - Head and Neck Surgery  [facility]    Kirstie Maradiaga MD  Otology & Neurotology  [facility]    IKirstie MD, saw this patient with the resident/fellow and agree with the resident s findings and plan of care as documented in the resident s/fellow s note. I was present for the entire procedure.         Scribe Preparation Attestation:  ILyudmila, a scribe, prepared the chart for today's encounter.

## 2019-08-27 NOTE — NURSING NOTE
"Chief Complaint   Patient presents with     Consult     Chronic mastoiditis of right side      Resp 16   Ht 1.854 m (6' 1\")   BMI 34.30 kg/m      Gunjan Gerber CMA    "

## 2019-08-27 NOTE — LETTER
2019       RE: Anurag Mejia  [address] [address] [place] [id number]     Dear Colleague,    Thank you for referring your patient, Anurag Mejia, to the [facility] EAR NOSE AND THROAT at [facility]. Please see a copy of my visit note below.      Neurotology Clinic      Name: Anurag Mejia  MRN: [id number]  Age: 68 year old  : 1950  Referring provider: Eddie Suggs  2019      Chief Complaint:   Chronic otitis media     History of Present Illness:   Anurag Mejia is a 68 year old male who presents for consultation regarding chronic otitis media.  Consultation was requested by Dr. Eddie Suggs. The patient has a known history of chronic otitis media for which he has undergone multiple otologic procedures. He reports recurrent acute otitis media as a child which persisted into adulthood. He has seen multiple providers regarding his ear issues, including Dr. Eddie Suggs. He most recently underwent left tympanoplasty with PORP placement with Dr. Suggs in . [facility] He reports that he has been experiencing bilateral purulent otorrhea since 2019 for which he was placed on multiple oral and topical antimicrobials including Bactrim, Cefdinir, clotrimazole topical, acetic acid topical, vancomycin topical, and Ciprodex. He was referred back to Dr. Suggs whom he saw on 2019 who referred the patient to the [facility]. He reports continued otorrhea, worse in the right ear. He has previously been fitted for hearing aids in  and currently wears them. He reports that he refrained from using them for weeks without resolution of otorrhea.  He uses Q-tips still as he likes the feel of them when his ears are wet and itchy.      Review of Systems:   Pertinent items are noted in HPI or as in patient entered ROS  below, remainder of complete ROS is negative.     Active Medications:     Current Outpatient Medications:      amoxicillin (AMOXIL) 875 MG tablet, Take 1 tablet (875 mg) by mouth 2 times daily, Disp: 20 tablet, Rfl: 0     BUPROPION HCL PO, Take 75 mg by mouth, Disp: , Rfl:      CELEBREX 200 MG OR CAPS, 1 Capsule daily, Disp: 30, Rfl: 0     cyclobenzaprine (FLEXERIL) 10 MG tablet, Take 10 mg by mouth daily, Disp: , Rfl:      DEXAMETHASONE PO, Take 4 mg by mouth, Disp: , Rfl:      esomeprazole (NEXIUM) 40 MG capsule, Take by mouth every morning (before breakfast) Take 30-60 minutes before eating., Disp: , Rfl:      gabapentin (NEURONTIN) 300 MG tablet, Take 600 mg by mouth 2 times daily, Disp: , Rfl:      IBUPROFEN PO, Take 200 mg by mouth, Disp: , Rfl:      latanoprost (XALATAN) 0.005 % ophthalmic solution, 1 drop At Bedtime, Disp: , Rfl:      LEXAPRO 10 MG OR TABS, 1 TABLET DAILY (Patient not taking: No sig reported), Disp: 30, Rfl: 6     loratadine (CLARITIN) 10 MG tablet, Take 10 mg by mouth daily, Disp: , Rfl:      mometasone (NASONEX) 50 MCG/ACT nasal spray, Spray 2 sprays into both nostrils daily, Disp: , Rfl:      nystatin-triamcinolone (MYCOLOG) ointment, Apply topically 2 times daily (Patient not taking: Reported on 6/4/2017), Disp: 30 g, Rfl: 1     rOPINIRole (REQUIP) 1 MG tablet, Take by mouth At Bedtime, Disp: , Rfl:      rOPINIRole (REQUIP) 2 MG tablet, Take 2 mg by mouth At Bedtime, Disp: , Rfl:      sildenafil (VIAGRA) 100 MG tablet, Take by mouth daily as needed, Disp: , Rfl:      tamsulosin (FLOMAX) 0.4 MG 24 hr capsule, Take by mouth daily, Disp: , Rfl:      TOPIRAMATE PO, Take 50 mg by mouth, Disp: , Rfl:      triamterene-hydrochlorothiazide (MAXZIDE-25) 37.5-25 MG per tablet, , Disp: , Rfl:      VICODIN 5-500 MG OR TABS, 1 TABLET EVERY 4 TO 6 HOURS AS NEEDED, Disp: 40, Rfl: 0     WELLBUTRIN  MG OR TBCR, 1 TABLET TWICE DAILY, Disp: 60, Rfl: 6      Allergies:   Adhesive tape; Moxifloxacin  hydrochloride; and Trovafloxacin      Past Medical History:  AVM  Cholesteatoma  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   Tobacco Use: Former smoker, current smokeless user  Alcohol Use: No  PCP: Chris Olsen      Physical Exam:   There were no vitals taken for this visit.       Constitutional:  The patient was accompanied by his wife, well-groomed, and in no acute distress.     Skin: Normal:  warm and pink without rash   Neurologic: Alert and oriented x 3.  CN's III-XII within normal limits.  Voice normal.    Psychiatric: The patient's affect was calm, cooperative, and appropriate.     Communication:  Normal; communicates verbally, normal voice quality.   Respiratory: Breathing comfortably without stridor or exertion of accessory muscles.    Eyes: Pupils were equal and reactive.  Extraocular movement intact.     Ears: Pinnae and tragus non-tender.      Otologic microscope exam:  Right ear: Ear canal with purulent otorrhea pooling in inferior sulcus, 15% anterosuperior TM perforation with clean edges, dry crusting on surface of TM extending from site of perforation to canal with overlying fungal hyphae, debrided with suction and right angle hook, diffuse TM tympanosclerosis.  Middle ear is clear and not infected in appearance.  Material was on surface of TM.  Left ear: Ear canal patent, dry, anterior dry crusting debrided with suction to reveal 5% TM perforation with squamous ingrowth, squamous debris partially  removed with right angle hook to confirm that growth extends under TM, TM with diffuse tympanosclerosis, cartilage graft vs. large HA prosthesis head in place in posteroinferior quadrant, mucosalization of superoposterior TM with associated otorrhea    Audiogram:  AUDIOGRAM: He underwent an audiogram today. This demonstrated:  Right normal sloping to severe sensorineural hearing loss.  Left moderate-severe sloping to severe mixed hearing loss.     The speech reception threshold is 30 dB on the right, 55 dB on the left. Word recognition 92% right, 100% left. Tympanograms showed type B on the right and type As on the left. Acoustic reflexes: absent in all conditions.    Lab Data  Ear culture (7/1/2019) - Klebsiella variicola, ampicillin resistant    Ear culture (5/19/2019) - Klebsiella pneumoniae, Enterococcus faecalis, Achromobacter xylosoxidans, ciprofloxacin resistant    Ear culture (2/5/2019) - Staphylococcus hominis, Aspergillus fumigatus    Imaging:  CT Temporal Bones (7/9/2019)    Impression from radiologist:    1.  Postsurgical change of left temporal bone with mastoidectomy and ossicular reconstruction prosthesis. The left hypotympanum is opacified as is the distal tip of the prosthesis, which may be debris, granulation, or cholesteatoma.     2. Near complete opacification of the right middle ear and mastoid. No bony erosion on the right.    3.  Chronic sclerosis of the bilateral mastoids.  There is some aeration of the left mastoid antrum.    CT Temporal Bones reviewed by us. Right mastoid cavity sclerotic, middle ear opacification. No erosion noted.  Left mastoid cavity sclerotic, osseous defect of posterior ear canal with soft tissue partially filling defect, PORP noted in middle ear, malleus remnant noted, no incus noted.       Assessment and Plan:  Anurag Mejia is a 68 year old male with bilateral chronic otitis media s/p multiple left-sided otologic surgeries now with persistent bilateral purulent  otorrhea.  The patient has experienced persistent purulent otorrhea from both ears despite multiple courses of oral and topical antimicrobial therapy.  The patient is previously been cultured, and per the patient's report different organisms grew from each ear, however the results of these cultures are unavailable at today's visit.  Right ear culture was obtained in clinic today.  The right tympanic membrane perforation margins appear clean and the patient is likely receiving benefit from this perforation with regard to middle ear ventilation. Recommend clotrimazole otic drops to right ear. We will call him with the results of his culture and advise regarding antibiotic use at this time. He should abstain from hearing aid use in his right ear and observe dry ear precautions.    With regard to his left ear minimal otorrhea was noted, however he has a 5% anteriorly based central perforation with evidence of squamous ingrowth. The squamous debris was partially debrided in clinic today, however he will need to continue to follow-up with a sophisticated observer to ensure that this does not demonstrate progression.  Patient also has evidence of mucosalization of the superior posterior portion of his TM with associated otorrhea.  Should he continue to experience otorrhea from this year we will consider application of otologic powder or TCA application to the site of mucosalization.    We will determine an appropriate time for follow-up once we have reviewed the results of his right ear culture.      Ajit Hurtado MD  Neurotology Fellow  Department of Otolaryngology - Head and Neck Surgery  [facility]    Kirstie Maradiaga MD  Otology & Neurotology  [facility]    Kirstie JACOB MD, saw this patient with the resident/fellow and agree with the resident s findings and plan of care as documented in the resident s/fellow s note. I was present for the entire procedure.         Scribe  Preparation Attestation:  I, Lyudmila Perea, a scribe, prepared the chart for today's encounter.      Again, thank you for allowing me to participate in the care of your patient.      Sincerely,    Kirstie Maradiaga MD

## 2019-08-27 NOTE — TELEPHONE ENCOUNTER
[facility] contact [facility] regarding [facility] but was transferred around to many different people with no answer as to who I needed to speak with! Not sure what else to do with this call. Next time they call back please get more information or a good phone number to call.     Isabel COLEMAN RN. . .  8/27/2019, 4:37 PM

## 2019-08-28 NOTE — TELEPHONE ENCOUNTER
I called the patient who will get the phone number and point person from the [facility], then have wife call me with that information so we can follow up with this.  Chen Padilla RN on 8/28/2019 at 3:55 PM

## 2019-08-28 NOTE — TELEPHONE ENCOUNTER
Reason for Call:  Other call back    Detailed comments: Anurag left this number for the [facility]   [phone]- fax  Attention Sherri    Phone Number Patient can be reached at: Home number on file [phone] (home)    Best Time: any    Can we leave a detailed message on this number? YES    Call taken on 8/28/2019 at 4:20 PM by Tonya Lerner

## 2019-08-29 LAB
BACTERIA SPEC CULT: NORMAL
SPECIMEN SOURCE: NORMAL

## 2019-08-29 NOTE — TELEPHONE ENCOUNTER
Faxed cover sheet to Sherri at fax [phone].. Requesting she call me this afternoon to get this straightened out.  Chen Padilla RN on 8/29/2019 at 9:01 AM

## 2019-09-03 LAB
BACTERIA SPEC CULT: NORMAL
SPECIMEN SOURCE: NORMAL

## 2019-09-03 NOTE — TELEPHONE ENCOUNTER
No response has been received after multiple methods and attempts to contact. Closing encounter.     Isabel COLEMAN RN. . .  9/3/2019, 2:59 PM

## 2019-09-06 ENCOUNTER — TELEPHONE (OUTPATIENT)
Dept: OTOLARYNGOLOGY | Facility: CLINIC | Age: 69
End: 2019-09-06

## 2019-09-06 NOTE — TELEPHONE ENCOUNTER
Spoke with patient regarding culture results. The cultures showed candida, a form of fungus.  He should complete 4 weeks of clotrimazole drops and then return for follow up examination.  Keep hearing aid out of the right ear during this time. Patient scheduled for 10/4/19 at 12:00. Call back information given.

## 2019-09-24 LAB
FUNGUS SPEC CULT: ABNORMAL
FUNGUS SPEC CULT: ABNORMAL
SPECIMEN SOURCE: ABNORMAL

## 2019-10-04 ENCOUNTER — OFFICE VISIT (OUTPATIENT)
Dept: OTOLARYNGOLOGY | Facility: CLINIC | Age: 69
End: 2019-10-04
Payer: COMMERCIAL

## 2019-10-04 VITALS
WEIGHT: 224.8 LBS | HEART RATE: 73 BPM | SYSTOLIC BLOOD PRESSURE: 172 MMHG | DIASTOLIC BLOOD PRESSURE: 78 MMHG | BODY MASS INDEX: 29.66 KG/M2

## 2019-10-04 DIAGNOSIS — H66.3X3 CHRONIC SUPPURATIVE OTITIS MEDIA OF BOTH EARS, UNSPECIFIED OTITIS MEDIA LOCATION: ICD-10-CM

## 2019-10-04 DIAGNOSIS — H66.3X1 CHRONIC SUPPURATIVE OTITIS MEDIA OF RIGHT EAR, UNSPECIFIED OTITIS MEDIA LOCATION: Primary | ICD-10-CM

## 2019-10-04 RX ORDER — CLOTRIMAZOLE 1 G/ML
SOLUTION TOPICAL 2 TIMES DAILY
Qty: 15 ML | Refills: 3 | Status: SHIPPED | OUTPATIENT
Start: 2019-10-04 | End: 2019-10-11

## 2019-10-04 RX ORDER — CIPROFLOXACIN AND DEXAMETHASONE 3; 1 MG/ML; MG/ML
SUSPENSION/ DROPS AURICULAR (OTIC)
Qty: 7.5 ML | Refills: 0 | Status: SHIPPED | OUTPATIENT
Start: 2019-10-04 | End: 2020-02-24

## 2019-10-04 ASSESSMENT — PAIN SCALES - GENERAL: PAINLEVEL: EXTREME PAIN (8)

## 2019-10-04 NOTE — LETTER
10/4/2019       RE: Anurag Mejia  [address] [address] [place] [phone]     Dear Colleague,    Thank you for referring your patient, Anurag Mejia, to the [facility] EAR NOSE AND THROAT at [facility]. Please see a copy of my visit note below.      Neurotology Clinic      Name: Anurag Mejia  MRN: [id number]  Age: 68 year old  : 1950  10/06/2019      Chief Complaint:   RECHECK       History of Present Illness:   Anurag eMjia is a 68 year old male who presents for follow up of chronic bilateral otitis media. He has a history of AOM as a child and now COM as an adult and has undergone multiple procedures. most recently, he underwent left tympanoplasty with PORP with Dr. Suggs in . [facility] and [facility]. He reports that he has been experiencing bilateral purulent otorrhea since 2019 for which he was placed on multiple oral and topical antimicrobials including Bactrim, Cefdinir, clotrimazole topical, acetic acid topical, vancomycin topical, and Ciprodex. He was referred back to Dr. Suggs who saw him  and referred him back here. We obtained cultures of right purulent otorrhea at that time which returned as candida and the patient has been on clotrimazole drops and has not been wearing his aid on the right side. He has not had any continued drainage from the right ear and has been happy that this has resolved. He has started to have drainage from the left ear for the past few days, but denies pain or other symptoms.     He has had many years of dizziness that has not resolved and has had multiple MRIs and neurology evaluations without improvement. He uses Q-tips still as he likes the feel of them when his ears are wet and itchy and states that this is not something he will be able to stop.     Of note, he reports gamma knife radiation to a brain AVM that was somewhere  between 8965-3553 and has not had any change in his dizziness symptoms or imbalance - he has been told that they are neurologic due to the skull base lesion or a complication of the gamma knife.     Review of Systems:   Pertinent items are noted in HPI or as in patient entered ROS below, remainder of complete ROS is negative.    ENT ROS 10/4/2019   Constitutional -   Neurology Dizzy spells, Headache   Psychology Frequently feeling anxious   Eyes Double vision   Ears, Nose, Throat -   Cardiopulmonary Cough   Gastrointestinal/Genitourinary Heartburn/indigestion, Diarrhea   Musculoskeletal Back pain, Neck pain   Endocrine Thirst, Heat or cold intolerance         Physical Exam:   BP (!) 172/78   Pulse 73   Wt 102 kg (224 lb 12.8 oz)   BMI 29.66 kg/m        Constitutional:  The patient was accompanied by his wife, well-groomed, and in no acute distress.     Skin: Normal:  warm and pink without rash   Neurologic: Alert and oriented x 3.  CN's III-XII within normal limits.  Voice normal.    Psychiatric: The patient's affect was calm, cooperative, and appropriate.     Communication:  Normal; communicates verbally, normal voice quality.   Respiratory: Breathing comfortably without stridor or exertion of accessory muscles.    Ears: Pinnae and tragus non-tender.       Otologic microscope exam:  Right ear: TM with anterior superior perforation that is dry and central.  EAC is normal and without drainage  Left ear: Anterior perforation in the TM, about 10% of TM.  Squamous debris removed from inside the perforation - it wraps the corner and I can reach around the corner with a right angle and remove it.  In the epitympanum, there is a small area of granulation was was cauterized with silver nitrate.  There was yellow drainage in the inferior canal that was suctioned.    Audiogram: The patient did not have an audiogram today    Imaging: No imaging today     Assessment and Plan:     This is a 68-year-old male who presents to  clinic today for follow-up for right ear drainage.  The patient has had a history of chronic otorrhea and has had at least one surgery on the left side by Dr. Suggs back in 2015.  The patient has tried several antibiotic drops in the right ear without resolution, however, he had a culture result with Candida and we placed him on clotrimazole drops on the right side for 4 weeks and he has had complete resolution.      His left ear has now started to drain. We treated this with silver nitrate and we will place him on Ciprodex for 10 days.  His TM perforation is stable but we did discuss that there is a small amount of skin on the wrong side of the TM. We explained to him that he may continue to have drainage out of both of his ears and needed these treatments intermittently.  He is interested in surgery so that it will cure his imbalance.  He cried when we discussed that no ear surgery will improve his imbalance.  He is not interested in middle ear surgery for another reason.    If he develops recurrent drainage from the right ear he will use clotrimazole drops and if he develops recurrent drainage in the left ear after this treatment he will use Ciprodex on the left side.  We discussed whether or not he would follow-[facility] in [facility] or [facility] and we told him that he could choose depending on what is more convenient for him.  He said that he would like to see us in 3 months and will let us know if [facility] would [facility] or if his plans change.    Follow-up: Return in about 3 months (around 1/4/2020).        Kirstie Maradiaga MD  Otology & Neurotology  [facility]    The documentation recorded by the scribe accurately reflects the services I personally performed and the decisions made by me.    Scribe Disclosure:  I, Lyudmila Perea, am serving as a scribe to document services personally performed by Kirstie Maradiaga MD at this visit, based upon the provider's statements to me. All  documentation has been reviewed by the aforementioned provider prior to being entered into the official medical record.         Again, thank you for allowing me to participate in the care of your patient.      Sincerely,    Kirstie Maradiaga MD

## 2019-10-04 NOTE — NURSING NOTE
Chief Complaint   Patient presents with     RECHECK     follow up visit, pt reports feeling light headed at times and off balance     Tessie Boswell LPN

## 2019-10-04 NOTE — PATIENT INSTRUCTIONS
1. You were seen in the ENT Clinic today by .  If you have any questions or concerns after your appointment, please call   - Option 1: ENT Clinic: [phone]  - Option 2: Lilian (' Nurse): [phone]    2.   Plan to return to clinic in 3 months.     TABITHA Quintana  [facility]- Otolaryngology  [phone]

## 2019-10-04 NOTE — PROGRESS NOTES
Neurotology Clinic      Name: Anurag Mejia  MRN: [id number]  Age: 68 year old  : 1950  10/06/2019      Chief Complaint:   RECHECK       History of Present Illness:   Anurag Mejia is a 68 year old male who presents for follow up of chronic bilateral otitis media. He has a history of AOM as a child and now COM as an adult and has undergone multiple procedures. most recently, he underwent left tympanoplasty with PORP with Dr. Suggs in . [facility]. He reports that he has been experiencing bilateral purulent otorrhea since 2019 for which he was placed on multiple oral and topical antimicrobials including Bactrim, Cefdinir, clotrimazole topical, acetic acid topical, vancomycin topical, and Ciprodex. He was referred back to Dr. Suggs who saw him  and referred him back here. We obtained cultures of right purulent otorrhea at that time which returned as candida and the patient has been on clotrimazole drops and has not been wearing his aid on the right side. He has not had any continued drainage from the right ear and has been happy that this has resolved. He has started to have drainage from the left ear for the past few days, but denies pain or other symptoms.     He has had many years of dizziness that has not resolved and has had multiple MRIs and neurology evaluations without improvement. He uses Q-tips still as he likes the feel of them when his ears are wet and itchy and states that this is not something he will be able to stop.     Of note, he reports gamma knife radiation to a brain AVM that was somewhere between 8043-1199 and has not had any change in his dizziness symptoms or imbalance - he has been told that they are neurologic due to the skull base lesion or a complication of the gamma knife.     Review of Systems:   Pertinent items are noted in HPI or as in patient entered ROS below, remainder  of complete ROS is negative.    ENT ROS 10/4/2019   Constitutional -   Neurology Dizzy spells, Headache   Psychology Frequently feeling anxious   Eyes Double vision   Ears, Nose, Throat -   Cardiopulmonary Cough   Gastrointestinal/Genitourinary Heartburn/indigestion, Diarrhea   Musculoskeletal Back pain, Neck pain   Endocrine Thirst, Heat or cold intolerance         Physical Exam:   BP (!) 172/78   Pulse 73   Wt 102 kg (224 lb 12.8 oz)   BMI 29.66 kg/m       Constitutional:  The patient was accompanied by his wife, well-groomed, and in no acute distress.     Skin: Normal:  warm and pink without rash   Neurologic: Alert and oriented x 3.  CN's III-XII within normal limits.  Voice normal.    Psychiatric: The patient's affect was calm, cooperative, and appropriate.     Communication:  Normal; communicates verbally, normal voice quality.   Respiratory: Breathing comfortably without stridor or exertion of accessory muscles.    Ears: Pinnae and tragus non-tender.       Otologic microscope exam:  Right ear: TM with anterior superior perforation that is dry and central.  EAC is normal and without drainage  Left ear: Anterior perforation in the TM, about 10% of TM.  Squamous debris removed from inside the perforation - it wraps the corner and I can reach around the corner with a right angle and remove it.  In the epitympanum, there is a small area of granulation was was cauterized with silver nitrate.  There was yellow drainage in the inferior canal that was suctioned.    Audiogram: The patient did not have an audiogram today    Imaging: No imaging today     Assessment and Plan:     This is a 68-year-old male who presents to clinic today for follow-up for right ear drainage.  The patient has had a history of chronic otorrhea and has had at least one surgery on the left side by Dr. Suggs back in 2015.  The patient has tried several antibiotic drops in the right ear without resolution, however, he had a culture  result with Candida and we placed him on clotrimazole drops on the right side for 4 weeks and he has had complete resolution.      His left ear has now started to drain. We treated this with silver nitrate and we will place him on Ciprodex for 10 days.  His TM perforation is stable but we did discuss that there is a small amount of skin on the wrong side of the TM. We explained to him that he may continue to have drainage out of both of his ears and needed these treatments intermittently.  He is interested in surgery so that it will cure his imbalance.  He cried when we discussed that no ear surgery will improve his imbalance.  He is not interested in middle ear surgery for another reason.    If he develops recurrent drainage from the right ear he will use clotrimazole drops and if he develops recurrent drainage in the left ear after this treatment he will use Ciprodex on the left side.  We discussed whether or not he would follow-up in our [facility] and we told him that he could choose depending on what is more convenient for him.  He said that he would like to see us in 3 months and will let us know if [facility] would [facility] or if his plans change.    Follow-up: Return in about 3 months (around 1/4/2020).        Kirstie Maradiaga MD  Otology & Neurotology  [facility]    The documentation recorded by the scribe accurately reflects the services I personally performed and the decisions made by me.    Scribe Disclosure:  I, Lyudmila Eliazar, am serving as a scribe to document services personally performed by Kirstie Maradiaga MD at this visit, based upon the provider's statements to me. All documentation has been reviewed by the aforementioned provider prior to being entered into the official medical record.

## 2020-01-07 ENCOUNTER — OFFICE VISIT (OUTPATIENT)
Dept: OTOLARYNGOLOGY | Facility: CLINIC | Age: 70
End: 2020-01-07
Payer: COMMERCIAL

## 2020-01-07 VITALS
HEART RATE: 58 BPM | WEIGHT: 230.5 LBS | DIASTOLIC BLOOD PRESSURE: 83 MMHG | BODY MASS INDEX: 30.41 KG/M2 | SYSTOLIC BLOOD PRESSURE: 140 MMHG

## 2020-01-07 DIAGNOSIS — H66.3X3 CHRONIC SUPPURATIVE OTITIS MEDIA OF BOTH EARS, UNSPECIFIED OTITIS MEDIA LOCATION: Primary | ICD-10-CM

## 2020-01-07 RX ORDER — BUPROPION HYDROCHLORIDE 200 MG/1
TABLET, EXTENDED RELEASE ORAL
COMMUNITY
Start: 2019-10-08

## 2020-01-07 RX ORDER — TIZANIDINE 2 MG/1
TABLET ORAL
COMMUNITY
Start: 2019-10-08

## 2020-01-07 RX ORDER — LOPERAMIDE HCL 2 MG
CAPSULE ORAL
COMMUNITY
Start: 2019-10-08

## 2020-01-07 ASSESSMENT — PAIN SCALES - GENERAL: PAINLEVEL: NO PAIN (0)

## 2020-01-07 NOTE — LETTER
2020       RE: Anurag Mejia  [address] [address] [place] [id number]     Dear Colleague,    Thank you for referring your patient, Anurag Mejia, to the [facility] EAR NOSE AND THROAT at [facility]. Please see a copy of my visit note below.      Neurotology Clinic      Name: Anurag Mejia  MRN: [id number]  Age: 69 year old  : 1950      Chief Complaint:   Follow up      History of Present Illness:   Anurag Mejia is a 69 year old male who presents for follow up of chronic bilateral otitis media. He has a history of AOM as a child and now COM as an adult and has undergone multiple procedures. most recently, he underwent left tympanoplasty with PORP with Dr. Suggs in . [facility] and [facility]. He had been experiencing bilateral purulent otorrhea for 8 months and had multiple oral and topical treatments prior to evaluation. With clotrimazole drops, his right otorrhea had resolved at his most recent appointment on 10/4/19. He had continued left otorrhea in October and we treated him with silver nitrate and ciprodex. Since that time, he feels his ears have overall been dry. He wears his right hearing aid all day but has been using clotrimazole on the right every 2 weeks. Of note, he has had a cough and rhinorrhea over the last few days, no fevers.      Review of Systems:   Pertinent items are noted in HPI or as in patient entered ROS below, remainder of complete ROS is negative.    ENT ROS 10/4/2019   Constitutional -   Neurology Dizzy spells, Headache   Psychology Frequently feeling anxious   Eyes Double vision   Ears, Nose, Throat -   Cardiopulmonary Cough   Gastrointestinal/Genitourinary Heartburn/indigestion, Diarrhea   Musculoskeletal Back pain, Neck pain   Endocrine Thirst, Heat or cold intolerance         Physical Exam:   BP (!) 140/83   Pulse 58   Wt 104.6 kg (230 lb  8 oz)   BMI 30.41 kg/m        Constitutional:  The patient was accompanied by his wife, well-groomed, and in no acute distress.     Skin: Normal:  warm and pink without rash   Neurologic: Alert and oriented x 3.  CN's III-XII within normal limits.  Voice normal.    Psychiatric: The patient's affect was calm, cooperative, and appropriate.     Communication:  Normal; communicates verbally, normal voice quality.   Respiratory: Breathing comfortably without stridor or exertion of accessory muscles.    Head/Face:  No lesions or scars.    Eyes: Pupils were equal and reactive.  Extraocular movement intact.     Ears: Pinnae and tragus non-tender.       Otologic microscope exam:  Right ear: Sloughing debris on surface tympanic membrane, canal skin is healthy and dry. Debrided with suction. Consistent with hearing aid use. Tube shaped hole in the anterior superior aspect of the tympanic membrane. Some sloughing squamous debris blocking the opening, I removed with suction. No purulent drainage. Anterior aspect of the malleus has a moist granulation tissue on it, towards the perforation.  No skin on the medial side of the drum on exam today.  Left ear: Postauricular incisions, healed. Ear canal skin healthy and dry all the way down to reconstructed tympanic membrane. Some debris adherent near the perforation in the anterior tympanic membrane.      Assessment and Plan:  Anurag Mejia is a 69 year old man with history of recurrent otitis externa related to hearing aid use. He has a stable appearance on exam today and has had improvement in otorrhea and the appearance of both ears. He does note some burning with otic drop administration so I reviewed advantages of using drops, but informed him of the option to use a q tip to apply the solution and prevent burning. I recommend he continue with what he is able to tolerate. We reviewed that he is going to have challenges off an on due to his hearing aids.  The right ear may need  more intervention in the future. [facility] and [facility] are [facility] and[facility]or see him for follow up. We will plan on follow up in 3-4 months and he will notify us with new concerns or changes.    Follow-up: Return in about 3 months (around 4/7/2020).      Kirstie Maradiaga MD  Otology & Neurotology  [facility]    The documentation recorded by the scribe accurately reflects the services I personally performed and the decisions made by me.    Scribe Disclosure:  I, Lyudmila Perea, am serving as a scribe to document services personally performed by Kirstie Maradiaga MD at this visit, based upon the provider's statements to me. All documentation has been reviewed by the aforementioned provider prior to being entered into the official medical record.       Again, thank you for allowing me to participate in the care of your patient.      Sincerely,    Kirstie Maradiaga MD

## 2020-01-07 NOTE — PATIENT INSTRUCTIONS
1. You were seen in the ENT Clinic today by .  If you have any questions or concerns after your appointment, please call   - Option 1: ENT Clinic: [phone]  - Option 2: Lilian (' Nurse): [phone]    2.   Plan to return to clinic in 3 - 4 months     TABITHA Quintana  [facility]- Otolaryngology  [phone]

## 2020-01-07 NOTE — NURSING NOTE
Chief Complaint   Patient presents with     RECHECK     3 month follow up visit   BP (!) 140/83   Pulse 58   Wt 104.6 kg (230 lb 8 oz)   BMI 30.41 kg/m      Tessie Boswell LPN

## 2020-01-07 NOTE — PROGRESS NOTES
Neurotology Clinic      Name: Anurag Mejia  MRN: [id number]  Age: 69 year old  : 1950      Chief Complaint:   Follow up      History of Present Illness:   Anurag Mejia is a 69 year old male who presents for follow up of chronic bilateral otitis media. He has a history of AOM as a child and now COM as an adult and has undergone multiple procedures. most recently, he underwent left tympanoplasty with PORP with Dr. Suggs in . [facility]. He had been experiencing bilateral purulent otorrhea for 8 months and had multiple oral and topical treatments prior to evaluation. With clotrimazole drops, his right otorrhea had resolved at his most recent appointment on 10/4/19. He had continued left otorrhea in October and we treated him with silver nitrate and ciprodex. Since that time, he feels his ears have overall been dry. He wears his right hearing aid all day but has been using clotrimazole on the right every 2 weeks. Of note, he has had a cough and rhinorrhea over the last few days, no fevers.      Review of Systems:   Pertinent items are noted in HPI or as in patient entered ROS below, remainder of complete ROS is negative.    ENT ROS 10/4/2019   Constitutional -   Neurology Dizzy spells, Headache   Psychology Frequently feeling anxious   Eyes Double vision   Ears, Nose, Throat -   Cardiopulmonary Cough   Gastrointestinal/Genitourinary Heartburn/indigestion, Diarrhea   Musculoskeletal Back pain, Neck pain   Endocrine Thirst, Heat or cold intolerance         Physical Exam:   BP (!) 140/83   Pulse 58   Wt 104.6 kg (230 lb 8 oz)   BMI 30.41 kg/m       Constitutional:  The patient was accompanied by his wife, well-groomed, and in no acute distress.     Skin: Normal:  warm and pink without rash   Neurologic: Alert and oriented x 3.  CN's III-XII within normal limits.  Voice normal.    Psychiatric: The patient's  affect was calm, cooperative, and appropriate.     Communication:  Normal; communicates verbally, normal voice quality.   Respiratory: Breathing comfortably without stridor or exertion of accessory muscles.    Head/Face:  No lesions or scars.    Eyes: Pupils were equal and reactive.  Extraocular movement intact.     Ears: Pinnae and tragus non-tender.       Otologic microscope exam:  Right ear: Sloughing debris on surface tympanic membrane, canal skin is healthy and dry. Debrided with suction. Consistent with hearing aid use. Tube shaped hole in the anterior superior aspect of the tympanic membrane. Some sloughing squamous debris blocking the opening, I removed with suction. No purulent drainage. Anterior aspect of the malleus has a moist granulation tissue on it, towards the perforation.  No skin on the medial side of the drum on exam today.  Left ear: Postauricular incisions, healed. Ear canal skin healthy and dry all the way down to reconstructed tympanic membrane. Some debris adherent near the perforation in the anterior tympanic membrane.      Assessment and Plan:  Anurag Mejia is a 69 year old man with history of recurrent otitis externa related to hearing aid use. He has a stable appearance on exam today and has had improvement in otorrhea and the appearance of both ears. He does note some burning with otic drop administration so I reviewed advantages of using drops, but informed him of the option to use a q tip to apply the solution and prevent burning. I recommend he continue with what he is able to tolerate. We reviewed that he is going to have challenges off an on due to his hearing aids.  The right ear may need more intervention in the future. [facility] and [facility] are happy [facility] and/or see him for follow up. We will plan on follow up in 3-4 months and he will notify us with new concerns or changes.    Follow-up: Return in about  3 months (around 4/7/2020).      Kirstie Maradiaga MD  Otology & Neurotology  [facility]    The documentation recorded by the scribe accurately reflects the services I personally performed and the decisions made by me.    Scribe Disclosure:  I, Lyudmila Perea, am serving as a scribe to document services personally performed by Kirstie Maradiaga MD at this visit, based upon the provider's statements to me. All documentation has been reviewed by the aforementioned provider prior to being entered into the official medical record.

## 2020-02-24 DIAGNOSIS — H66.3X3 CHRONIC SUPPURATIVE OTITIS MEDIA OF BOTH EARS, UNSPECIFIED OTITIS MEDIA LOCATION: ICD-10-CM

## 2020-02-24 RX ORDER — CIPROFLOXACIN AND DEXAMETHASONE 3; 1 MG/ML; MG/ML
SUSPENSION/ DROPS AURICULAR (OTIC)
Qty: 7.5 ML | Refills: 0 | Status: SHIPPED | OUTPATIENT
Start: 2020-02-24

## 2020-02-24 NOTE — PROGRESS NOTES
Patient calling saying he went in to see Dr. Olsen because his ear is all swollen and he can't get his hearing aid in. Per patient they wanted him to get the ciprodex refilled with Dr. Maradiaga. They did give him a new prescription for oral Amoxicillin, but the patient says Amoxicillin does not work for him. This nurse called Dr. Olsen's nurse Tonya. Dicussed patient concerns and Tonya said she would get a message to Dr. Olsen and contact the patient.

## 2020-06-26 ENCOUNTER — TELEPHONE (OUTPATIENT)
Dept: OTOLARYNGOLOGY | Facility: CLINIC | Age: 70
End: 2020-06-26

## 2020-06-26 NOTE — TELEPHONE ENCOUNTER
[facility]. Will keep appt with us.    Nellie Nelson LPN

## 2020-06-30 ENCOUNTER — OFFICE VISIT (OUTPATIENT)
Dept: OTOLARYNGOLOGY | Facility: CLINIC | Age: 70
End: 2020-06-30
Payer: COMMERCIAL

## 2020-06-30 VITALS
HEIGHT: 73 IN | RESPIRATION RATE: 14 BRPM | OXYGEN SATURATION: 99 % | HEART RATE: 70 BPM | WEIGHT: 234 LBS | SYSTOLIC BLOOD PRESSURE: 113 MMHG | BODY MASS INDEX: 31.01 KG/M2 | DIASTOLIC BLOOD PRESSURE: 75 MMHG | TEMPERATURE: 98 F

## 2020-06-30 DIAGNOSIS — H66.3X3 CHRONIC SUPPURATIVE OTITIS MEDIA OF BOTH EARS, UNSPECIFIED OTITIS MEDIA LOCATION: Primary | ICD-10-CM

## 2020-06-30 RX ORDER — ALLOPURINOL 100 MG/1
TABLET ORAL
COMMUNITY
Start: 2020-06-15

## 2020-06-30 RX ORDER — CIPROFLOXACIN AND DEXAMETHASONE 3; 1 MG/ML; MG/ML
5 SUSPENSION/ DROPS AURICULAR (OTIC) 2 TIMES DAILY
Qty: 15 ML | Refills: 0 | Status: SHIPPED | OUTPATIENT
Start: 2020-06-30 | End: 2020-07-14

## 2020-06-30 ASSESSMENT — PAIN SCALES - GENERAL: PAINLEVEL: SEVERE PAIN (6)

## 2020-06-30 ASSESSMENT — MIFFLIN-ST. JEOR: SCORE: 1890.3

## 2020-06-30 NOTE — PROGRESS NOTES
"Neurotology Clinic  June 30, 2020    Chief Complaint: Follow up    HPI: Anurag Mejia is a 69 year old male who presents for follow up of chronic bilateral otitis media. He has a history of AOM as a child and now COM as an adult and has undergone multiple procedures. most recently, he underwent left tympanoplasty with PORP with Dr. Suggs in 2004. His right ear feels dry but left ear moist since we last saw him 1/7/2020. His right hearing aid has also been dysfunctional and he is getting it fixed. He has attempted to allow his ear some daily time without the hearing aids in order to keep it dry. No other otologic symptoms today.    Of note, He has attempted to get an appt at [facility] Friday [facility] with Dr. Carroll but she has been overbooked and the car service does not run on Friday's.       Physical examination:  /75   Pulse 70   Temp 98  F (36.7  C)   Resp 14   Ht 1.854 m (6' 1\")   Wt 106.1 kg (234 lb)   SpO2 99%   BMI 30.87 kg/m    Constitutional:  In no acute distress, appears stated age  Eyes:  Extraocular movements intact  Ears: No external deformity or skin changes, Non-tender pinnae and tragus     Respiratory:  No increased work of breathing, wheezing or stridor  Skin:  No rashes on the head and neck  Neurologic:  House Brackman 1/6 bilaterally  Psychiatric:  Alert, normal affect, answering questions appropriately    Otologic Microscope Exam  Right ear: EAC with thin dry flaky cerumen removed with right angle. Canal skin non-erythematous, without lesions, and dry. Small posterior EAC with pinpoint yellow/white otorrhea.  15% TM perforation in ant/sup region. Middle ear clear. Anterior aspect of the malleus has a moist granulation tissue on it, towards the perforation.    Left ear: EAC with white-clear otorrhea more proximal to TM. Reconstructed tympanic membrane. Pinpoint ant/sup perforation. Middle ear clear.     Audiogram:  Last completed 08/19/2019 (per chart review)     Assessment " and plan:  Anurag Mejia is a 69 year old man with history of recurrent otitis externa related to hearing aid use.  Overall exam is stable with some baseline L. Otorrhea.     - Ciprodex L. Ear BID 2wks   - RTC in about 3mo     MD Kirstie Guan MD  Otology & Neurotology  [facility]    I, Kirstie Maradiaga MD, saw this patient with the resident/fellow and agree with the resident s findings and plan of care as documented in the resident s/fellow s note. I was present for the entire procedure.

## 2020-06-30 NOTE — NURSING NOTE
"Chief Complaint   Patient presents with     RECHECK     follow up      Blood pressure 113/75, pulse 70, temperature 98  F (36.7  C), resp. rate 14, height 1.854 m (6' 1\"), weight 106.1 kg (234 lb), SpO2 99 %.    Omkar Richards LPN    "

## 2020-06-30 NOTE — LETTER
"6/30/2020       RE: Anurag Mejia  [address] [address] [place] [id number]     Dear Colleague,    Thank you for referring your patient, Anurag Mejia, to the [facility] EAR NOSE AND THROAT at [facility]. Please see a copy of my visit note below.    Neurotology Clinic  June 30, 2020    Chief Complaint: Follow up    HPI: Anurag Mejia is a 69 year old male who presents for follow up of chronic bilateral otitis media. He has a history of AOM as a child and now COM as an adult and has undergone multiple procedures. most recently, he underwent left tympanoplasty with PORP with Dr. Suggs in 2004. His right ear feels dry but left ear moist since we last saw him 1/7/2020. His right hearing aid has also been dysfunctional and he is getting it fixed. He has attempted to allow his ear some daily time without the hearing aids in order to keep it dry. No other otologic symptoms today.    Of note, He has attempted to get an appt at [facility] Friday clinic with Dr. Carroll but she has been overbooked and the car service does not run on Friday's.       Physical examination:  /75   Pulse 70   Temp 98  F (36.7  C)   Resp 14   Ht 1.854 m (6' 1\")   Wt 106.1 kg (234 lb)   SpO2 99%   BMI 30.87 kg/m    Constitutional:  In no acute distress, appears stated age  Eyes:  Extraocular movements intact  Ears: No external deformity or skin changes, Non-tender pinnae and tragus     Respiratory:  No increased work of breathing, wheezing or stridor  Skin:  No rashes on the head and neck  Neurologic:  House Brackman 1/6 bilaterally  Psychiatric:  Alert, normal affect, answering questions appropriately    Otologic Microscope Exam  Right ear: EAC with thin dry flaky cerumen removed with right angle. Canal skin non-erythematous, without lesions, and dry. Small posterior EAC with pinpoint yellow/white otorrhea.  15% TM perforation in ant/sup region. Middle ear clear. Anterior aspect of " the malleus has a moist granulation tissue on it, towards the perforation.    Left ear: EAC with white-clear otorrhea more proximal to TM. Reconstructed tympanic membrane. Pinpoint ant/sup perforation. Middle ear clear.     Audiogram:  Last completed 08/19/2019 (per chart review)     Assessment and plan:  Anurag Mejia is a 69 year old man with history of recurrent otitis externa related to hearing aid use.  Overall exam is stable with some baseline L. Otorrhea.     - Ciprodex L. Ear BID 2wks   - RTC in about 3mo     MD Kirstie Guan MD  Otology & Neurotology  [facility]    I, Kirstie Maradiaga MD, saw this patient with the resident/fellow and agree with the resident s findings and plan of care as documented in the resident s/fellow s note. I was present for the entire procedure.      Again, thank you for allowing me to participate in the care of your patient.      Sincerely,    Kirstie Mraadiaga MD

## 2020-08-28 ENCOUNTER — TELEPHONE (OUTPATIENT)
Dept: OTOLARYNGOLOGY | Facility: CLINIC | Age: 70
End: 2020-08-28

## 2020-08-28 NOTE — TELEPHONE ENCOUNTER
[facility] Call Center    Phone Message    May a detailed message be left on voicemail: yes     Reason for Call: Medication Refill Request    Has the patient contacted the pharmacy for the refill? Yes   Name of medication being requested: ciprofloxacin-dexamethasone (CIPRODEX) 0.3-0.1 % otic suspension   Provider who prescribed the medication: Lilian Mendoza, RN   Pharmacy: [facility] [place], [place] - [address] [address]   Date medication is needed: ASAP     Pt called seeing if he could get this Rx refilled as he is out. Pt stated that it is helping his ear infections. Please advise, thank you!      Action Taken: Message routed to:  [facility] ([facility] ENT    Travel Screening: Not Applicable

## 2020-08-28 NOTE — TELEPHONE ENCOUNTER
Pt was looking for oral meds. He has having a hard time getting the drops in his ears since there is some crust in his ears and wax build up. [facility] Pt was agreeable to this.    Pt was reminded he is do for a visit in Sept or Oct and to schedule something soon as she is booking up fast.    Nellie Nelson LPN

## 2024-04-03 ENCOUNTER — TRANSFERRED RECORDS (OUTPATIENT)
Dept: HEALTH INFORMATION MANAGEMENT | Facility: CLINIC | Age: 74
End: 2024-04-03

## 2024-05-17 ENCOUNTER — TRANSFERRED RECORDS (OUTPATIENT)
Dept: HEALTH INFORMATION MANAGEMENT | Facility: CLINIC | Age: 74
End: 2024-05-17
Payer: COMMERCIAL

## 2024-05-22 ENCOUNTER — TELEPHONE (OUTPATIENT)
Dept: CARDIOLOGY | Facility: CLINIC | Age: 74
End: 2024-05-22
Payer: COMMERCIAL

## 2024-05-22 DIAGNOSIS — R07.2 PRECORDIAL PAIN: Primary | ICD-10-CM

## 2024-05-22 DIAGNOSIS — R07.89 CHEST TIGHTNESS: ICD-10-CM

## 2024-05-22 NOTE — TELEPHONE ENCOUNTER
Received [facility] [place] [facility] orders NM stress test signed by Ibeth Hanson NP   Reason for request: chest tightness with activity. Very unsteady gait - needs pharmacological stress test.     [facility] Auth Number: [id number]  Phone: [phone]  Fax: [phone]    Sent [facility] referral documents via fax to HIM to be scanned into patient's chart     Routing to scheduling to call patient to set up lexiscan stress test.

## 2024-06-04 ENCOUNTER — HOSPITAL ENCOUNTER (OUTPATIENT)
Dept: CARDIOLOGY | Facility: CLINIC | Age: 74
Discharge: HOME | End: 2024-06-04
Payer: COMMERCIAL

## 2024-06-04 ENCOUNTER — HOSPITAL ENCOUNTER (OUTPATIENT)
Dept: NUCLEAR MEDICINE | Facility: CLINIC | Age: 74
Setting detail: NUCLEAR MEDICINE
Discharge: HOME | End: 2024-06-04
Payer: COMMERCIAL

## 2024-06-04 DIAGNOSIS — R07.89 CHEST TIGHTNESS: ICD-10-CM

## 2024-06-04 DIAGNOSIS — R07.2 PRECORDIAL PAIN: ICD-10-CM

## 2024-06-04 LAB
CV STRESS MAX HR HE: 69
NUC STRESS EJECTION FRACTION: 43 %
RATE PRESSURE PRODUCT: 8418
STRESS ECHO BASELINE DIASTOLIC HE: 68
STRESS ECHO BASELINE HR: 56 BPM
STRESS ECHO BASELINE SYSTOLIC BP: 118
STRESS ECHO CALCULATED PERCENT HR: 46 %
STRESS ECHO LAST STRESS DIASTOLIC BP: 70
STRESS ECHO LAST STRESS SYSTOLIC BP: 122
STRESS ECHO TARGET HR: 149

## 2024-06-04 PROCEDURE — 78452 HT MUSCLE IMAGE SPECT MULT: CPT

## 2024-06-04 PROCEDURE — 78452 HT MUSCLE IMAGE SPECT MULT: CPT | Mod: 26 | Performed by: INTERNAL MEDICINE

## 2024-06-04 PROCEDURE — 250N000011 HC RX IP 250 OP 636: Mod: JZ

## 2024-06-04 PROCEDURE — 93018 CV STRESS TEST I&R ONLY: CPT | Performed by: INTERNAL MEDICINE

## 2024-06-04 PROCEDURE — 343N000001 HC RX 343

## 2024-06-04 PROCEDURE — A9502 TC99M TETROFOSMIN: HCPCS

## 2024-06-04 PROCEDURE — 93016 CV STRESS TEST SUPVJ ONLY: CPT | Performed by: INTERNAL MEDICINE

## 2024-06-04 RX ORDER — REGADENOSON 0.08 MG/ML
0.4 INJECTION, SOLUTION INTRAVENOUS ONCE
Status: COMPLETED | OUTPATIENT
Start: 2024-06-04 | End: 2024-06-04

## 2024-06-04 RX ADMIN — TETROFOSMIN 10.5 MILLICURIE: 1.38 INJECTION, POWDER, LYOPHILIZED, FOR SOLUTION INTRAVENOUS at 08:10

## 2024-06-04 RX ADMIN — REGADENOSON 0.4 MG: 0.08 INJECTION, SOLUTION INTRAVENOUS at 09:37

## 2024-06-04 RX ADMIN — TETROFOSMIN 31.9 MILLICURIE: 1.38 INJECTION, POWDER, LYOPHILIZED, FOR SOLUTION INTRAVENOUS at 09:30
